# Patient Record
(demographics unavailable — no encounter records)

---

## 2024-12-10 NOTE — PHYSICAL EXAM
[FreeTextEntry3] :  Gen:                        Well appearing, well nourished, NAD. Skin:                       Eccrine:                 Within normal limits                Face:                      Neck:                      Chest:                                Back:                      UE: Neuro:                     No focal deficits. Age appropriate. Psych:                     Appropriate affect.

## 2024-12-10 NOTE — HISTORY OF PRESENT ILLNESS
[FreeTextEntry1] : itching and pin sensation on the back [de-identified] : pt presents for itching and pin sensation on the back in absence of associated rash lacks typical pigmentary changes seen with notalgia, but in absence of associated rash and pin-like sensation, this points to most likely neuropathic etiology of her symptoms. localized to the back only. could likely be related to degenerative spine changes and nerve impingement? Denies any history of neuropathy or diabetes. does have PCP. amenable to trial of topical tx, d/w pt and daugther topicals may not be as effective for neuropathic etiology. theywould like to pursue neuro eval as well - consider imaging if indicated, could consider gabapentin if appropriate. importance of regular f/up with PCP also encouraged for comphresive eval and mgmt of medical conditions incl ongoing DM and other age appropriate screening

## 2024-12-10 NOTE — ASSESSMENT
[FreeTextEntry1] : pt presents for itching and pin sensation on the back in absence of associated rash lacks typical pigmentary changes seen with notalgia, but in absence of associated rash and pin-like sensation, this points to most likely neuropathic etiology of her symptoms. localized to the back only. could likely be related to degenerative spine changes and nerve impingement?  Normal skin exam today Denies any history of neuropathy or diabetes. does have PCP. amenable to trial of topical tx, d/w pt and daugther topicals may not be as effective for neuropathic etiology. theywould like to pursue neuro eval as well - consider imaging if indicated, could consider gabapentin if appropriate. importance of regular f/up with PCP also encouraged for comphresive eval and mgmt of medical conditions incl ongoing DM and other age appropriate screening trial rx TAC lotion gentle skin care measures also discussed